# Patient Record
Sex: FEMALE | Race: OTHER | HISPANIC OR LATINO | URBAN - METROPOLITAN AREA
[De-identification: names, ages, dates, MRNs, and addresses within clinical notes are randomized per-mention and may not be internally consistent; named-entity substitution may affect disease eponyms.]

---

## 2017-09-10 ENCOUNTER — EMERGENCY (EMERGENCY)
Facility: HOSPITAL | Age: 47
LOS: 1 days | Discharge: HOME | End: 2017-09-10
Attending: EMERGENCY MEDICINE
Payer: MEDICAID

## 2017-09-10 VITALS
SYSTOLIC BLOOD PRESSURE: 110 MMHG | OXYGEN SATURATION: 98 % | RESPIRATION RATE: 16 BRPM | HEART RATE: 84 BPM | TEMPERATURE: 99 F | DIASTOLIC BLOOD PRESSURE: 64 MMHG

## 2017-09-10 VITALS
TEMPERATURE: 99 F | OXYGEN SATURATION: 100 % | HEART RATE: 89 BPM | HEIGHT: 62 IN | SYSTOLIC BLOOD PRESSURE: 93 MMHG | WEIGHT: 115.08 LBS | RESPIRATION RATE: 18 BRPM | DIASTOLIC BLOOD PRESSURE: 61 MMHG

## 2017-09-10 PROCEDURE — 99284 EMERGENCY DEPT VISIT MOD MDM: CPT

## 2017-09-10 PROCEDURE — 99282 EMERGENCY DEPT VISIT SF MDM: CPT

## 2017-09-10 RX ORDER — PENICILLIN V POTASSIUM 250 MG
1 TABLET ORAL
Qty: 30 | Refills: 0 | OUTPATIENT
Start: 2017-09-10 | End: 2017-09-20

## 2017-09-10 RX ORDER — IBUPROFEN 200 MG
1 TABLET ORAL
Qty: 20 | Refills: 0 | OUTPATIENT
Start: 2017-09-10 | End: 2017-09-15

## 2017-09-12 ENCOUNTER — EMERGENCY (EMERGENCY)
Facility: HOSPITAL | Age: 47
LOS: 1 days | Discharge: ANOTHER HOSPITAL | End: 2017-09-12
Attending: EMERGENCY MEDICINE
Payer: MEDICAID

## 2017-09-12 VITALS
DIASTOLIC BLOOD PRESSURE: 66 MMHG | WEIGHT: 115.08 LBS | RESPIRATION RATE: 20 BRPM | HEIGHT: 61 IN | OXYGEN SATURATION: 100 % | TEMPERATURE: 98 F | SYSTOLIC BLOOD PRESSURE: 101 MMHG | HEART RATE: 90 BPM

## 2017-09-12 LAB
ALBUMIN SERPL ELPH-MCNC: 3.1 G/DL — LOW (ref 3.5–5)
ALP SERPL-CCNC: 81 U/L — SIGNIFICANT CHANGE UP (ref 40–120)
ALT FLD-CCNC: 19 U/L DA — SIGNIFICANT CHANGE UP (ref 10–60)
ANION GAP SERPL CALC-SCNC: 9 MMOL/L — SIGNIFICANT CHANGE UP (ref 5–17)
APTT BLD: 28.5 SEC — SIGNIFICANT CHANGE UP (ref 27.5–37.4)
AST SERPL-CCNC: 17 U/L — SIGNIFICANT CHANGE UP (ref 10–40)
BILIRUB SERPL-MCNC: 0.3 MG/DL — SIGNIFICANT CHANGE UP (ref 0.2–1.2)
BUN SERPL-MCNC: 7 MG/DL — SIGNIFICANT CHANGE UP (ref 7–18)
CALCIUM SERPL-MCNC: 8.5 MG/DL — SIGNIFICANT CHANGE UP (ref 8.4–10.5)
CHLORIDE SERPL-SCNC: 106 MMOL/L — SIGNIFICANT CHANGE UP (ref 96–108)
CO2 SERPL-SCNC: 24 MMOL/L — SIGNIFICANT CHANGE UP (ref 22–31)
CREAT SERPL-MCNC: 0.82 MG/DL — SIGNIFICANT CHANGE UP (ref 0.5–1.3)
GLUCOSE SERPL-MCNC: 80 MG/DL — SIGNIFICANT CHANGE UP (ref 70–99)
HCG SERPL-ACNC: <1 MIU/ML — SIGNIFICANT CHANGE UP
HCT VFR BLD CALC: 33.6 % — LOW (ref 34.5–45)
HGB BLD-MCNC: 11.1 G/DL — LOW (ref 11.5–15.5)
INR BLD: 1.21 RATIO — HIGH (ref 0.88–1.16)
MCHC RBC-ENTMCNC: 26.6 PG — LOW (ref 27–34)
MCHC RBC-ENTMCNC: 32.9 GM/DL — SIGNIFICANT CHANGE UP (ref 32–36)
MCV RBC AUTO: 80.8 FL — SIGNIFICANT CHANGE UP (ref 80–100)
PLATELET # BLD AUTO: 289 K/UL — SIGNIFICANT CHANGE UP (ref 150–400)
POTASSIUM SERPL-MCNC: 4.1 MMOL/L — SIGNIFICANT CHANGE UP (ref 3.5–5.3)
POTASSIUM SERPL-SCNC: 4.1 MMOL/L — SIGNIFICANT CHANGE UP (ref 3.5–5.3)
PROT SERPL-MCNC: 7 G/DL — SIGNIFICANT CHANGE UP (ref 6–8.3)
PROTHROM AB SERPL-ACNC: 13.2 SEC — HIGH (ref 9.8–12.7)
RBC # BLD: 4.16 M/UL — SIGNIFICANT CHANGE UP (ref 3.8–5.2)
RBC # FLD: 16.5 % — HIGH (ref 10.3–14.5)
SODIUM SERPL-SCNC: 139 MMOL/L — SIGNIFICANT CHANGE UP (ref 135–145)
WBC # BLD: 15.1 K/UL — HIGH (ref 3.8–10.5)
WBC # FLD AUTO: 15.1 K/UL — HIGH (ref 3.8–10.5)

## 2017-09-12 PROCEDURE — 99285 EMERGENCY DEPT VISIT HI MDM: CPT

## 2017-09-12 PROCEDURE — 70470 CT HEAD/BRAIN W/O & W/DYE: CPT | Mod: 26

## 2017-09-12 PROCEDURE — 70491 CT SOFT TISSUE NECK W/DYE: CPT | Mod: 26

## 2017-09-12 RX ORDER — MECLIZINE HCL 12.5 MG
25 TABLET ORAL ONCE
Qty: 0 | Refills: 0 | Status: COMPLETED | OUTPATIENT
Start: 2017-09-12 | End: 2017-09-12

## 2017-09-12 RX ORDER — DEXAMETHASONE 0.5 MG/5ML
10 ELIXIR ORAL ONCE
Qty: 0 | Refills: 0 | Status: COMPLETED | OUTPATIENT
Start: 2017-09-12 | End: 2017-09-12

## 2017-09-12 RX ORDER — KETOROLAC TROMETHAMINE 30 MG/ML
15 SYRINGE (ML) INJECTION ONCE
Qty: 0 | Refills: 0 | Status: DISCONTINUED | OUTPATIENT
Start: 2017-09-12 | End: 2017-09-12

## 2017-09-12 RX ORDER — AMPICILLIN SODIUM AND SULBACTAM SODIUM 250; 125 MG/ML; MG/ML
3 INJECTION, POWDER, FOR SUSPENSION INTRAMUSCULAR; INTRAVENOUS ONCE
Qty: 0 | Refills: 0 | Status: COMPLETED | OUTPATIENT
Start: 2017-09-12 | End: 2017-09-12

## 2017-09-12 RX ORDER — AMPICILLIN SODIUM AND SULBACTAM SODIUM 250; 125 MG/ML; MG/ML
3 INJECTION, POWDER, FOR SUSPENSION INTRAMUSCULAR; INTRAVENOUS EVERY 6 HOURS
Qty: 0 | Refills: 0 | Status: DISCONTINUED | OUTPATIENT
Start: 2017-09-13 | End: 2017-09-16

## 2017-09-12 RX ORDER — KETOROLAC TROMETHAMINE 30 MG/ML
10 SYRINGE (ML) INJECTION ONCE
Qty: 0 | Refills: 0 | Status: DISCONTINUED | OUTPATIENT
Start: 2017-09-12 | End: 2017-09-12

## 2017-09-12 RX ORDER — SODIUM CHLORIDE 9 MG/ML
1000 INJECTION INTRAMUSCULAR; INTRAVENOUS; SUBCUTANEOUS
Qty: 0 | Refills: 0 | Status: DISCONTINUED | OUTPATIENT
Start: 2017-09-12 | End: 2017-09-16

## 2017-09-12 RX ORDER — DEXAMETHASONE 0.5 MG/5ML
10 ELIXIR ORAL ONCE
Qty: 0 | Refills: 0 | Status: DISCONTINUED | OUTPATIENT
Start: 2017-09-12 | End: 2017-09-12

## 2017-09-12 RX ORDER — AMPICILLIN SODIUM AND SULBACTAM SODIUM 250; 125 MG/ML; MG/ML
INJECTION, POWDER, FOR SUSPENSION INTRAMUSCULAR; INTRAVENOUS
Qty: 0 | Refills: 0 | Status: DISCONTINUED | OUTPATIENT
Start: 2017-09-12 | End: 2017-09-16

## 2017-09-12 RX ADMIN — Medication 15 MILLIGRAM(S): at 21:37

## 2017-09-12 RX ADMIN — Medication 102 MILLIGRAM(S): at 19:50

## 2017-09-12 RX ADMIN — Medication 15 MILLIGRAM(S): at 19:50

## 2017-09-12 RX ADMIN — SODIUM CHLORIDE 1000 MILLILITER(S): 9 INJECTION INTRAMUSCULAR; INTRAVENOUS; SUBCUTANEOUS at 20:03

## 2017-09-12 RX ADMIN — AMPICILLIN SODIUM AND SULBACTAM SODIUM 200 GRAM(S): 250; 125 INJECTION, POWDER, FOR SUSPENSION INTRAMUSCULAR; INTRAVENOUS at 19:45

## 2017-09-12 RX ADMIN — Medication 25 MILLIGRAM(S): at 19:45

## 2017-09-12 NOTE — ED PROVIDER NOTE - ATTENDING CONTRIBUTION TO CARE
Patient presenting with dizziness (spinning) x 1 month- worse today. It is constant and not better with stillness and eye closing. Also worsening sore throat, voice changes and fever to 103. Seen here 2 days ago for sore throat and Rx'd PCN which is not helping. No other sx. No other PMHx.  VSS. Ill appearing, whispy voice, no trismus, no drooling. + enlarge tonsil, seems to have early PTA clinically (suspect more by sx and voice). No nystagmus. Gait unsteady, can't do tandem gait, BEVERLY and FTN slow and feel 'Hard". R > L. Will check labs, give IV Unasyn, Decadron, Zofran and meclizine. Will check CT head with IVC and CT neck with same. Anticipate abscess or RP cellulitis and need for MRI.

## 2017-09-12 NOTE — ED PROVIDER NOTE - PROGRESS NOTE DETAILS
Patient feelign better but still has whispy voice. ENT hre did not call back after two pages. [facility] called and ENT and ED there have accepted patient. She has post OP collection as well as anterolateral PTA. Also still having vertigo sx (much less) which have elements concerning for central causes. Getting second dose of Unasyn Accepted to ED by Dr. Oreilly.

## 2017-09-12 NOTE — ED PROVIDER NOTE - MEDICAL DECISION MAKING DETAILS
Pt with significant dizziness and throat swelling, not improving with antibiotics.  will get soft tissue neck CT to r/o PTA, will get CT with contrast to r/o mass vs posterior circulation stroke,  IV fluids, steroids,  abx

## 2017-09-12 NOTE — ED PROVIDER NOTE - OBJECTIVE STATEMENT
45 yo F c/o weakness and dizziness for 1 month complicated by throat infection that she was given antibiotics for 2 days ago.  Pt returns to the ED because her dizziness has gotten worse and her throat infection is not getting better.  Pt unable to ambulate due to dizziness.  Fever 103 at home.  Unable to tolerate PO due to pain.  No vomiting, + nausea, no diarrhea 45 yo F c/o weakness and dizziness for 1 month complicated by throat infection that she was given antibiotics for 2 days ago.  Pt returns to the ED because her dizziness has gotten worse and her throat infection is not getting better.  Pt unable to ambulate due to dizziness.  Fever 103 at home.  Unable to tolerate PO due to pain.  No vomiting, + nausea, no diarrhea.

## 2017-09-13 ENCOUNTER — EMERGENCY (EMERGENCY)
Facility: HOSPITAL | Age: 47
LOS: 1 days | Discharge: HOME | End: 2017-09-13
Attending: EMERGENCY MEDICINE | Admitting: EMERGENCY MEDICINE
Payer: MEDICAID

## 2017-09-13 VITALS
SYSTOLIC BLOOD PRESSURE: 92 MMHG | RESPIRATION RATE: 16 BRPM | HEART RATE: 74 BPM | DIASTOLIC BLOOD PRESSURE: 55 MMHG | TEMPERATURE: 98 F | OXYGEN SATURATION: 100 %

## 2017-09-13 VITALS
TEMPERATURE: 98 F | RESPIRATION RATE: 16 BRPM | HEART RATE: 86 BPM | DIASTOLIC BLOOD PRESSURE: 51 MMHG | SYSTOLIC BLOOD PRESSURE: 92 MMHG | OXYGEN SATURATION: 98 %

## 2017-09-13 DIAGNOSIS — J02.0 STREPTOCOCCAL PHARYNGITIS: ICD-10-CM

## 2017-09-13 LAB
GRAM STN WND: SIGNIFICANT CHANGE UP
SPECIMEN SOURCE: SIGNIFICANT CHANGE UP

## 2017-09-13 PROCEDURE — 86850 RBC ANTIBODY SCREEN: CPT

## 2017-09-13 PROCEDURE — 86900 BLOOD TYPING SEROLOGIC ABO: CPT

## 2017-09-13 PROCEDURE — 80053 COMPREHEN METABOLIC PANEL: CPT

## 2017-09-13 PROCEDURE — 84702 CHORIONIC GONADOTROPIN TEST: CPT

## 2017-09-13 PROCEDURE — 85610 PROTHROMBIN TIME: CPT

## 2017-09-13 PROCEDURE — 96374 THER/PROPH/DIAG INJ IV PUSH: CPT | Mod: XU

## 2017-09-13 PROCEDURE — 85730 THROMBOPLASTIN TIME PARTIAL: CPT

## 2017-09-13 PROCEDURE — 70491 CT SOFT TISSUE NECK W/DYE: CPT

## 2017-09-13 PROCEDURE — 99218: CPT

## 2017-09-13 PROCEDURE — 70470 CT HEAD/BRAIN W/O & W/DYE: CPT

## 2017-09-13 PROCEDURE — 86901 BLOOD TYPING SEROLOGIC RH(D): CPT

## 2017-09-13 PROCEDURE — 99285 EMERGENCY DEPT VISIT HI MDM: CPT | Mod: 25

## 2017-09-13 PROCEDURE — 96375 TX/PRO/DX INJ NEW DRUG ADDON: CPT

## 2017-09-13 PROCEDURE — 85027 COMPLETE CBC AUTOMATED: CPT

## 2017-09-13 RX ORDER — MECLIZINE HCL 12.5 MG
25 TABLET ORAL ONCE
Qty: 0 | Refills: 0 | Status: DISCONTINUED | OUTPATIENT
Start: 2017-09-13 | End: 2017-09-13

## 2017-09-13 RX ORDER — AMPICILLIN SODIUM AND SULBACTAM SODIUM 250; 125 MG/ML; MG/ML
3 INJECTION, POWDER, FOR SUSPENSION INTRAMUSCULAR; INTRAVENOUS ONCE
Qty: 0 | Refills: 0 | Status: COMPLETED | OUTPATIENT
Start: 2017-09-13 | End: 2017-09-13

## 2017-09-13 RX ORDER — SODIUM CHLORIDE 9 MG/ML
1000 INJECTION INTRAMUSCULAR; INTRAVENOUS; SUBCUTANEOUS ONCE
Qty: 0 | Refills: 0 | Status: COMPLETED | OUTPATIENT
Start: 2017-09-13 | End: 2017-09-13

## 2017-09-13 RX ORDER — OXYCODONE AND ACETAMINOPHEN 5; 325 MG/1; MG/1
1 TABLET ORAL ONCE
Qty: 0 | Refills: 0 | Status: DISCONTINUED | OUTPATIENT
Start: 2017-09-13 | End: 2017-09-13

## 2017-09-13 RX ORDER — DEXAMETHASONE 0.5 MG/5ML
10 ELIXIR ORAL EVERY 8 HOURS
Qty: 0 | Refills: 0 | Status: COMPLETED | OUTPATIENT
Start: 2017-09-13 | End: 2017-09-13

## 2017-09-13 RX ORDER — MECLIZINE HCL 12.5 MG
25 TABLET ORAL THREE TIMES A DAY
Qty: 0 | Refills: 0 | Status: DISCONTINUED | OUTPATIENT
Start: 2017-09-13 | End: 2017-09-17

## 2017-09-13 RX ORDER — SODIUM CHLORIDE 9 MG/ML
1000 INJECTION INTRAMUSCULAR; INTRAVENOUS; SUBCUTANEOUS
Qty: 0 | Refills: 0 | Status: DISCONTINUED | OUTPATIENT
Start: 2017-09-13 | End: 2017-09-17

## 2017-09-13 RX ORDER — KETOROLAC TROMETHAMINE 30 MG/ML
30 SYRINGE (ML) INJECTION ONCE
Qty: 0 | Refills: 0 | Status: DISCONTINUED | OUTPATIENT
Start: 2017-09-13 | End: 2017-09-13

## 2017-09-13 RX ADMIN — Medication 30 MILLIGRAM(S): at 08:21

## 2017-09-13 RX ADMIN — Medication 100 MILLIGRAM(S): at 12:49

## 2017-09-13 RX ADMIN — AMPICILLIN SODIUM AND SULBACTAM SODIUM 200 GRAM(S): 250; 125 INJECTION, POWDER, FOR SUSPENSION INTRAMUSCULAR; INTRAVENOUS at 02:16

## 2017-09-13 RX ADMIN — Medication 100 MILLIGRAM(S): at 21:59

## 2017-09-13 RX ADMIN — SODIUM CHLORIDE 150 MILLILITER(S): 9 INJECTION INTRAMUSCULAR; INTRAVENOUS; SUBCUTANEOUS at 10:09

## 2017-09-13 RX ADMIN — Medication 30 MILLIGRAM(S): at 07:48

## 2017-09-13 RX ADMIN — Medication 102 MILLIGRAM(S): at 15:44

## 2017-09-13 RX ADMIN — Medication 102 MILLIGRAM(S): at 08:07

## 2017-09-13 RX ADMIN — Medication 100 MILLIGRAM(S): at 06:45

## 2017-09-13 RX ADMIN — OXYCODONE AND ACETAMINOPHEN 1 TABLET(S): 5; 325 TABLET ORAL at 18:29

## 2017-09-13 RX ADMIN — OXYCODONE AND ACETAMINOPHEN 1 TABLET(S): 5; 325 TABLET ORAL at 17:47

## 2017-09-13 RX ADMIN — SODIUM CHLORIDE 1000 MILLILITER(S): 9 INJECTION INTRAMUSCULAR; INTRAVENOUS; SUBCUTANEOUS at 05:20

## 2017-09-13 RX ADMIN — SODIUM CHLORIDE 1000 MILLILITER(S): 9 INJECTION INTRAMUSCULAR; INTRAVENOUS; SUBCUTANEOUS at 08:25

## 2017-09-13 NOTE — ED PROVIDER NOTE - OBJECTIVE STATEMENT
47 y/o F pt with no sig PMHx, transferred here from [facility], arrives to the ED c/o improved throat pain, vertigo, decreased eating/drinking (due to throat pain), and a subjective fever for 4 days, As per sister, pt was in the ED on Sunday and was given PCN  mg (every 8 hours for 3 days) which has some relief. Denies SOB, N/V, or any other complaints. No daily meds. NKDA.

## 2017-09-13 NOTE — ED ADULT NURSE NOTE - OBJECTIVE STATEMENT
Pt received AxOx4, was transferred here from [facility] for ENT evaluation for a peritonsil abscess. Air way is patent on exam, denies any difficulty breathing at this time. Breathing even, unlabored. VS as noted. ED providers are aware, ENT was called for consult. VS as noted. IV Fluid started as ordered. pt has 20G IV placed on L AC from [facility]. Will continue to monitor.

## 2017-09-13 NOTE — CONSULT NOTE ADULT - SUBJECTIVE AND OBJECTIVE BOX
HPI:  Pt is a 47 yo F with a PMH of Depression (diagnosed at [facility] during a visit for dizziness) who initially presented to [facility] with throat pain, decreased PO intake, fever x4 days and was transferred to [facility] for Peritonsillar Abscess (seen on CT neck) treatement. Neurology was consulted to evaluate cause of pt's persistent dizziness. Pt endorses that her dizziness started 6 months ago, but was less severe and intermittent when to compared to now.  Two weeks ago her symptoms started to gradually worsen, which is why she went to [facility], and was diagnosed with Depression and started on Sertraline. Associated with her dizziness is HA, nausea and unstable gait. Patient denies any vision changes, numbness, tingling, weakness, restricted neck movements, choking, CP, SOB, cough, vomiting, abdominal pain, changes in BMs/urination or loss of bladder/bowel control.      MEDICATIONS  (STANDING):  Sertraline  clindamycin IVPB      clindamycin IVPB 900 milliGRAM(s) IV Intermittent every 8 hours  sodium chloride 0.9%. 1000 milliLiter(s) (150 mL/Hr) IV Continuous <Continuous>    MEDICATIONS  (PRN):    PAST MEDICAL & SURGICAL HISTORY:  Depression  No significant past surgical history    FAMILY HISTORY:  No pertinent family history in first degree relatives    Allergies    No Known Allergies    Intolerances        SHx - No smoking, No ETOH, No drug abuse      Review of Systems:  See HPI.    Vital Signs Last 24 Hrs  T(C): 37.2 (13 Sep 2017 14:15), Max: 37.2 (13 Sep 2017 14:15)  T(F): 99 (13 Sep 2017 14:15), Max: 99 (13 Sep 2017 14:15)  HR: 62 (13 Sep 2017 14:15) (62 - 80)  BP: 96/59 (13 Sep 2017 14:15) (92/55 - 99/65)  BP(mean): --  RR: 16 (13 Sep 2017 14:15) (14 - 16)  SpO2: 100% (13 Sep 2017 14:15) (96% - 100%)    General Exam:   General appearance: No acute distress    Neurological Exam:  Mental Status: Orientated to self, date and place.  Attention intact.  No dysarthria. Speech fluent.  Cranial Nerves: PERRL, EOMI, VFF, no nystagmus, Ivan-Hallpike elicits mild torsional nystagmus on the right. CN V1-3 intact to light touch. No facial asymmetry. Hearing intact to finger rub bilaterally. Tongue, uvula and palate midline. Sternocleidomastoid and Trapezius intact bilaterally.    Motor:   Tone: normal, no neck stiffness or decreased ROM.               Strength:     [] Upper extremity                      Delt       Bicep    Tricep                                                  R         5/5 5/5 5/5 5/5                                               L          5/5 5/5 5/5 5/5  [] Lower extremity                       HF          KE          KF        DF         PF                                               R        5/5 5/5 5/5 5/5 5/5                                               L         5/5 5/5 5/5 5/5 5/5  Pronator drift: none                 Dysmetria: None to finger-nose-finger or heel-shin-heel b/l  No truncal ataxia.    Tremor: No resting, postural or action tremor.  No myoclonus.    Sensation: intact to light touch throughout    Deep Tendon Reflexes:              Biceps          BR        Patellar       Ankle       Babinski                                         R       2+               2+        2+              2+           downgoing                                         L        2+               2+        2+              2+           downgoing    Gait: unsteady

## 2017-09-13 NOTE — ED PROVIDER NOTE - PROGRESS NOTE DETAILS
ENT unable to drain abscess, recommend CDU for clinda/decadron and Dr sethi to come by later . Accepted by CDU

## 2017-09-13 NOTE — CONSULT NOTE ADULT - ASSESSMENT
Pt is a 45 yo F with a PMH of Depression (diagnosed at [facility] during a visit for dizziness) who initially presented to [facility] with throat pain, decreased PO intake, fever x4 days and was transferred to [facility] for Peritonsillar Abcess (seen on CT neck) treatement. Neurology was consulted to evaluate cause of pt's persistent dizziness. Based on an exam with positive Ivan-Hallpike on the right showing nystagmus and a prolonged hx of intermittent dizziness, worsened over the past 2 weeks and recent diagnosis of Peritonsillar Abscess. This is likely a peripheral vertigo that is worsened by an acute infection and decreased PO intake. However possible extension of infection into the inner ear or mastoid air cells is possibly also affecting her.    Recommendations:  - MRI Head w/ and w/o contrast with thin slices through the Ear Canal and Mastoid Air Cells  - C/W Meclizine 25mg PO Q8hr PRN Dizziness  --- Can give Valium 5mg PO Q8hr PRN persistent Dizziness  - C/W Zofran 4mg IV Q6hr PRN Nausea  - F/U w/ Neuro as outpatient, can f/u w/ own Neurologist or [address] [place] [address]

## 2017-09-13 NOTE — ED PROVIDER NOTE - QUALITY
improving throat pain, vertigo, decreased eating/drinking (due to throat pain), and a subjective fever

## 2017-09-13 NOTE — ED CDU PROVIDER NOTE - PLAN OF CARE
Rest, drink plenty of fluids.  Advance activity as tolerated.  Continue all previously prescribed medications as directed. Follow up with neuro Dr. Woodruff at [phone] and get vestibular PT done, with ENT Dr. Webster at [phone]. Follow up with your primary care physician in 48-72 hours- bring copies of your results.  Take Medrol dose pack as directed. Return to the ER for worsening or persistent symptoms, and/or ANY NEW OR CONCERNING SYMPTOMS. If you have issues obtaining follow up, please call: [phone] to obtain a doctor or specialist who takes your insurance in your area. Rest, drink plenty of fluids.  Advance activity as tolerated.  Continue all previously prescribed medications as directed. Follow up with neuro Dr. Woodruff at [phone] and get vestibular PT done, with ENT Dr. Webster at [phone]. Follow up with your primary care physician in 48-72 hours- bring copies of your results.  Take Medrol dose pack as directed. Start Clindamycin 300 mg 4 times a day for 10 days. Return to the ER for worsening or persistent symptoms, and/or ANY NEW OR CONCERNING SYMPTOMS. If you have issues obtaining follow up, please call: [phone] to obtain a doctor or specialist who takes your insurance in your area.

## 2017-09-13 NOTE — CONSULT NOTE ADULT - SUBJECTIVE AND OBJECTIVE BOX
HPI  46F no sig PMH who initially presented to [facility] with throat pain, decreased PO intake, fever x4 days and was transferred to [facility] for further eval after CT neck showed R PTA and RF fluid/edema. Patient is able to tolerate secretions, denies SOB, denies HA, is able to move neck freely.     Denies SOB, N/V, or any other complaints. No daily meds. NKDA.  Past Medical and Surgical History:  No pertinent past medical history  No significant past surgical history    Medications  MEDICATIONS  (STANDING):  clindamycin IVPB      clindamycin IVPB 900 milliGRAM(s) IV Intermittent every 8 hours  dexamethasone  IVPB 10 milliGRAM(s) IV Intermittent every 8 hours  sodium chloride 0.9%. 1000 milliLiter(s) (150 mL/Hr) IV Continuous <Continuous>    MEDICATIONS  (PRN):    Allergies  No Known Allergies    Review of Systems  General: Negative.    Neurological: Negative.  Opthalmologic: Negative.  ENT: Negative except for HPI.  Respiratory: Negative.    Cardiovascular: Negative.    Gastrointestinal: Negative.    Genitourinary: Negative.    Musculoskeletal: Negative.    Dermatologic: Negative.    Endocrinology: Negative.    Hematological: Negative.    Psychiatric: Negative.      Vital signs past 24h  Vital Signs Last 24 Hrs  T(C): 36.7 (13 Sep 2017 07:15), Max: 36.7 (13 Sep 2017 03:39)  T(F): 98.1 (13 Sep 2017 07:15), Max: 98.1 (13 Sep 2017 07:15)  HR: 70 (13 Sep 2017 07:15) (70 - 80)  BP: 98/58 (13 Sep 2017 07:15) (92/55 - 99/65)  BP(mean): --  RR: 16 (13 Sep 2017 07:15) (15 - 16)  SpO2: 98% (13 Sep 2017 07:15) (98% - 100%)    Physical Exam  Constitutional: Well developed, in no distress  HENT:         Head: Normocephalic and atraumatic       Face: Symmetric, no lesion or mass       Ears: Normal       Nose: Normal       Oral cavity and oropharynx: tongue midline, floor of mouth flat and soft, uvula slightly deviated to the left, no significant soft palate fullness, L tonsil +3 inflammed       Neck: Soft, flat, trachea midline, R sided LAD  Eyes: PERRL, EOMI  Pulmonary/Chest: Breathing comfortably on room air, no stridor or stertor  Abdominal: Non-distended  Genitourinary: Not indicated  Neurological: No focal neuro deficit, CN 2-12 grossly intact  Skin: No obvious lesion or rash  Musculoskeletal: No deformities noted, full ROM in all major joints    Psychiatric: Alert, appropriate responses and attention span     Flexible Laryngoscopy  NC: no turbinate hypertrophy, no copious secretions  OP: deviation to the left as noted on imaging however airway remains patent, tonsil inflamed and enlarged as previously noted, L vallecula effaced; BOT, posterior pharyngeal wall wnl  Supraglottis: epiglottis sharp, AE folds normal, mild arytenoid swelling c/w LPR, false vocal cords wnl  Hypopharynx: R pyriform sinuses effaced, no significant posterior pharyngeal wall edema, mild post-cricoid swelling c/w LPR  Glottis: true vocal cords with normal mobility and no lesion, edema or erythema    Procedure  Attempted but unsuccessful needle aspiration of PTA. Given no significant soft palate fullness, ? tonsillar abscess.    Imaging  Images reviewed    CT neck IMPRESSION:  Two peripherally enhancing peritonsillar collections as above, which may  have subtle communication, however not clearly identified on this  examination. Retropharyngeal edema/fluid.    Assessment and Plan  46F with PTA vs tonsillar abscess s/p attempted drainage which was unsuccessful ? tonsillar abscess instead of PTA also with RP fluid/edema. FOE shows OP deviated to the left but airway patent.  -s/p decadron x1  -continue IV clindamycin  -monitor for improvement  -if clinically worsens in the next 24h will likely admit and repeat CT neck  -call/page for worsening respiratory status, difficulty with neck motion, focal neuro signs or any other concerns  -discussed with attending Dr. Webster

## 2017-09-13 NOTE — ED CDU PROVIDER NOTE - CHPI ED SYMPTOMS POS
improving throat pain, vertigo, decreased eating/drinking (due to throat pain), and a subjective fever/DECREASED EATING/DRINKING/PAIN/FEVER

## 2017-09-13 NOTE — PROGRESS NOTE ADULT - SUBJECTIVE AND OBJECTIVE BOX
Procedure:    after verbal consent was obtained, 2cc of 1% lisocaine w/ 1:100,000 epinpnephrine was injected into the right lateral pharynx immediately posterior to the RMT. 1cc of pus was aspirated with 18G. #11 was used to make a small incision in the same region which was probed with a cotton tip applicator. pt tolerated well. sent for wound cx.     -f/u MRI for vertigo  -f/u wound cx  -finish clinda IV x 24 hours  -DC steroids

## 2017-09-13 NOTE — ED PROVIDER NOTE - CHPI ED SYMPTOMS POS
PAIN/FEVER/improving throat pain, vertigo, decreased eating/drinking (due to throat pain), and a subjective fever/DECREASED EATING/DRINKING

## 2017-09-13 NOTE — ED ADULT NURSE REASSESSMENT NOTE - NS ED NURSE REASSESS COMMENT FT1
pt is resting in bed, states that throat pain is better now. denies any trouble breathing at this time. no sign of acute distress noted at this time. VS as noted. Report was given to CDU RN. Will continue to monitor until transfer

## 2017-09-13 NOTE — ED CDU PROVIDER NOTE - PROGRESS NOTE DETAILS
I have personally seen and examined this patient. I have fully participated in the care of this patient. I have reviewed all pertinent clinical information, including history physical exam, plan and the PA's note and agree except as noted. CORINNA Jeffery MD  46F presents with pharyngitis.  She was initially seen at an OSH ED was started on antibiotics for pharyngitis. Also reports several months of intermittent vertigo and headaches, which have been worsening. + subjective fever. Symptoms continued and yesterday had CT neck showing two areas of PTA with retropharyngeal edema and fluid. CT head w contrast negative for infarct or mass. Started on decadron and augmentin, transferred for ENT.  In the ED, attempted I&D by ENT without significant drainage.   Now reports persistent HA, vertigo and throat pain, painful to swallow. Overnight afebrile, VSS. On exam well appearing, nad, tolerating secrtions, + R OP swelling w uvular deviation, lungs clear, rrr, no rash, no edema, 2+ pulses, speech clear, awake and alert.  Seen by ENT and was able to drain abscess.  Also seen by neurology given vertigo and unsteady gait, recommend MRI, but more likely peripheral vertigo.  If MRI negative, patient is medically stable for discharge home. PA Gerasimou - pt slept comfortably overnight. states pain is improving. ENT to reassess today. MRI negative, neuro to re-eval this AM. pt to be signed out to day PA. GASTON Becerril: Pt admits to feeling better, denies any complaints. Pt admits to be able to tolerate solid and liquid PO. Spoke to ENT and Neuro, both cleared the pt to dc home with out patient follow up with neuro Dr. Woodruff at [phone] and get vestibular PT done, with ENT Dr. Webster at [phone]. Dc pt home with medrol dose pack and antibiotics. DISCHARGE NOTE-AKILA WHITMAN MD   Patient is alert and oriented to person, place and date.  Patient is appropriate.  Patient denies any new or worsening physical complaints.  Patient feeling better and wants to go home.  Patient is awake and alert and in no acute distress.  Normocephalic/atraumatic.  Auricles are normal.  Neck supple.  Lungs CTAB, no wheeze, no rhonchi,  no rales.  Heart is regular rate and rhythm.  Back is nontender.  Moving all 4 extremities.   Neurologically grossly intact.  Affect is appropriate.  Right PTA s/p I+D.  Patient tolerating PO.   Understands f/u with ENT and Neuro.  Understands return instruction if undble to tolerate PO or medications.  Results of testing discussed with patient at bedside.   Copies of results provided to patient for discharge.  Patient understands that they develop new or worsening symptoms, to return to the ER immediately or call their PMD.  Patient's vital signs remain stable.  Stable for discharge.    Discharge to home.    -Akila Whitman MD

## 2017-09-13 NOTE — ED CDU PROVIDER NOTE - OBJECTIVE STATEMENT
45 y/o F pt with no sig PMHx, transferred here from [facility], arrives to the ED c/o improved throat pain, vertigo, decreased eating/drinking (due to throat pain), and a subjective fever for 4 days, As per sister, pt was in the ED on Sunday and was given PCN  mg (every 8 hours for 3 days) which has some relief. Denies SOB, N/V, or any other complaints. No daily meds. NKDA.    CDU Note: Agree with above. 45 y/o female no pmh presents to ED from [place] [facility] for PTA. Pt. went to [facility] for sore throat failing outpt axithromycin- in  ED has ct showing + PTA (multiple locations) - sent to ED for ent eval. ENT unable to drain abscess - sent to CDU for IV abx, decadron, pain control - and ENT w/ attg (Darin) will reassess possible attempt to drain in AM.

## 2017-09-13 NOTE — ED CDU PROVIDER NOTE - ATTENDING CONTRIBUTION TO CARE
Dr. Garcia:  I performed a face to face bedside interview with patient regarding history of present illness, review of symptoms and past medical history. I completed an independent physical exam.  I have discussed patient's plan of care with PA.   I agree with note as stated above, having amended the EMR as needed to reflect my findings.   This includes HISTORY OF PRESENT ILLNESS, HIV, PAST MEDICAL/SURGICAL/FAMILY/SOCIAL HISTORY, ALLERGIES AND HOME MEDICATIONS, REVIEW OF SYSTEMS, PHYSICAL EXAM, and any PROGRESS NOTES during the time I functioned as the attending physician for this patient.    46F denies pmh presents with fever and sore throat, transferred from [facility] to [facility] for ENT evaluation and management of peritonsilar abscess.    Exam:  - well appearing  - +right peritonsillar abscess and lymphadenopathy  - tolerating secretions  - rrr  - ctab    A/P  - ENT resident unable to drain PTA  - CDU for continued IV abx and ENT attending to re-evaluate

## 2017-09-13 NOTE — CONSULT NOTE ADULT - ATTENDING COMMENTS
PAtient seen and examined at bedside with neurology team and above note reviewed and I agree with assessment and plan as outlined. Patient exam consistent with peripheral vertigo and MRI brain reviewed and was normal with no intracranial pathology. She reported feeling a lot better today after the meclizine and she will follow up outpatient for vestibular therapy. She understood plan and is willing to comply.

## 2017-09-13 NOTE — ED ADULT TRIAGE NOTE - CHIEF COMPLAINT QUOTE
Pt is transfer from [place] for ENT eval for PTA. C/o right sided throat/ear pain since Friday. 3G Unasyn infusing during arrival to triage. Respirations even and unlabored in triage.

## 2017-09-13 NOTE — ED ADULT NURSE NOTE - CHIEF COMPLAINT QUOTE
Pt is transfer from [place] for ENT eval for PTA. C/o right sided throat/ear pain since Friday. 3G Unasyn infusing during arrival to triage. Respirations even and unlabored in triage.

## 2017-09-13 NOTE — ED PROVIDER NOTE - TIMING
constant/sudden onset/improving throat pain, vertigo, decreased eating/drinking (due to throat pain), and a subjective fever

## 2017-09-13 NOTE — ED PROVIDER NOTE - MEDICAL DECISION MAKING DETAILS
47 y/o F pt with PTA and vertigo secondary to infection and peripheral. Improved with medications from outside hospital. Plan to consult ENT.

## 2017-09-14 VITALS
TEMPERATURE: 98 F | SYSTOLIC BLOOD PRESSURE: 96 MMHG | OXYGEN SATURATION: 99 % | DIASTOLIC BLOOD PRESSURE: 55 MMHG | RESPIRATION RATE: 16 BRPM | HEART RATE: 65 BPM

## 2017-09-14 LAB
ALBUMIN SERPL ELPH-MCNC: 3 G/DL — LOW (ref 3.3–5)
ALP SERPL-CCNC: 61 U/L — SIGNIFICANT CHANGE UP (ref 40–120)
ALT FLD-CCNC: 9 U/L — SIGNIFICANT CHANGE UP (ref 4–33)
AST SERPL-CCNC: 10 U/L — SIGNIFICANT CHANGE UP (ref 4–32)
BASE EXCESS BLDV CALC-SCNC: -6 MMOL/L — SIGNIFICANT CHANGE UP
BASOPHILS # BLD AUTO: 0 K/UL — SIGNIFICANT CHANGE UP (ref 0–0.2)
BASOPHILS NFR BLD AUTO: 0 % — SIGNIFICANT CHANGE UP (ref 0–2)
BILIRUB SERPL-MCNC: 0.2 MG/DL — SIGNIFICANT CHANGE UP (ref 0.2–1.2)
BLOOD GAS VENOUS - CREATININE: 0.55 MG/DL — SIGNIFICANT CHANGE UP (ref 0.5–1.3)
BUN SERPL-MCNC: 10 MG/DL — SIGNIFICANT CHANGE UP (ref 7–23)
CALCIUM SERPL-MCNC: 8 MG/DL — LOW (ref 8.4–10.5)
CHLORIDE BLDV-SCNC: 107 MMOL/L — SIGNIFICANT CHANGE UP (ref 96–108)
CHLORIDE SERPL-SCNC: 107 MMOL/L — SIGNIFICANT CHANGE UP (ref 98–107)
CO2 SERPL-SCNC: 17 MMOL/L — LOW (ref 22–31)
CREAT SERPL-MCNC: 0.57 MG/DL — SIGNIFICANT CHANGE UP (ref 0.5–1.3)
EOSINOPHIL # BLD AUTO: 0 K/UL — SIGNIFICANT CHANGE UP (ref 0–0.5)
EOSINOPHIL NFR BLD AUTO: 0 % — SIGNIFICANT CHANGE UP (ref 0–6)
GAS PNL BLDV: 131 MMOL/L — LOW (ref 136–146)
GLUCOSE BLDV-MCNC: 102 — HIGH (ref 70–99)
GLUCOSE SERPL-MCNC: 103 MG/DL — HIGH (ref 70–99)
HCO3 BLDV-SCNC: 20 MMOL/L — SIGNIFICANT CHANGE UP (ref 20–27)
HCT VFR BLD CALC: 29.4 % — LOW (ref 34.5–45)
HCT VFR BLDV CALC: 30.4 % — LOW (ref 34.5–45)
HGB BLD-MCNC: 9.5 G/DL — LOW (ref 11.5–15.5)
HGB BLDV-MCNC: 9.8 G/DL — LOW (ref 11.5–15.5)
IMM GRANULOCYTES # BLD AUTO: 0.09 # — SIGNIFICANT CHANGE UP
IMM GRANULOCYTES NFR BLD AUTO: 0.6 % — SIGNIFICANT CHANGE UP (ref 0–1.5)
LACTATE BLDV-MCNC: 1.4 MMOL/L — SIGNIFICANT CHANGE UP (ref 0.5–2)
LYMPHOCYTES # BLD AUTO: 1.3 K/UL — SIGNIFICANT CHANGE UP (ref 1–3.3)
LYMPHOCYTES # BLD AUTO: 9 % — LOW (ref 13–44)
MCHC RBC-ENTMCNC: 26.2 PG — LOW (ref 27–34)
MCHC RBC-ENTMCNC: 32.3 % — SIGNIFICANT CHANGE UP (ref 32–36)
MCV RBC AUTO: 81.2 FL — SIGNIFICANT CHANGE UP (ref 80–100)
MONOCYTES # BLD AUTO: 0.95 K/UL — HIGH (ref 0–0.9)
MONOCYTES NFR BLD AUTO: 6.6 % — SIGNIFICANT CHANGE UP (ref 2–14)
NEUTROPHILS # BLD AUTO: 12.09 K/UL — HIGH (ref 1.8–7.4)
NEUTROPHILS NFR BLD AUTO: 83.8 % — HIGH (ref 43–77)
NRBC # FLD: 0 — SIGNIFICANT CHANGE UP
PCO2 BLDV: 36 MMHG — LOW (ref 41–51)
PH BLDV: 7.34 PH — SIGNIFICANT CHANGE UP (ref 7.32–7.43)
PLATELET # BLD AUTO: 264 K/UL — SIGNIFICANT CHANGE UP (ref 150–400)
PMV BLD: 10.2 FL — SIGNIFICANT CHANGE UP (ref 7–13)
PO2 BLDV: 73 MMHG — HIGH (ref 35–40)
POTASSIUM BLDV-SCNC: 3.8 MMOL/L — SIGNIFICANT CHANGE UP (ref 3.4–4.5)
POTASSIUM SERPL-MCNC: 4.1 MMOL/L — SIGNIFICANT CHANGE UP (ref 3.5–5.3)
POTASSIUM SERPL-SCNC: 4.1 MMOL/L — SIGNIFICANT CHANGE UP (ref 3.5–5.3)
PROT SERPL-MCNC: 5.6 G/DL — LOW (ref 6–8.3)
RBC # BLD: 3.62 M/UL — LOW (ref 3.8–5.2)
RBC # FLD: 18.1 % — HIGH (ref 10.3–14.5)
SAO2 % BLDV: 94.5 % — HIGH (ref 60–85)
SODIUM SERPL-SCNC: 136 MMOL/L — SIGNIFICANT CHANGE UP (ref 135–145)
SPECIMEN SOURCE: SIGNIFICANT CHANGE UP
WBC # BLD: 14.43 K/UL — HIGH (ref 3.8–10.5)
WBC # FLD AUTO: 14.43 K/UL — HIGH (ref 3.8–10.5)

## 2017-09-14 PROCEDURE — 99217: CPT

## 2017-09-14 PROCEDURE — 99284 EMERGENCY DEPT VISIT MOD MDM: CPT | Mod: GC

## 2017-09-14 PROCEDURE — 70553 MRI BRAIN STEM W/O & W/DYE: CPT | Mod: 26

## 2017-09-14 RX ORDER — ACETAMINOPHEN 500 MG
650 TABLET ORAL ONCE
Qty: 0 | Refills: 0 | Status: DISCONTINUED | OUTPATIENT
Start: 2017-09-14 | End: 2017-09-17

## 2017-09-14 RX ORDER — DEXAMETHASONE 0.5 MG/5ML
10 ELIXIR ORAL ONCE
Qty: 0 | Refills: 0 | Status: DISCONTINUED | OUTPATIENT
Start: 2017-09-14 | End: 2017-09-17

## 2017-09-14 RX ADMIN — Medication 25 MILLIGRAM(S): at 06:57

## 2017-09-14 RX ADMIN — Medication 100 MILLIGRAM(S): at 06:57

## 2017-09-14 NOTE — ED POST DISCHARGE NOTE - RESULT SUMMARY
Wound Gram Stain: Gram Positive Rods, Gram Positive Cocci in Pairs, WBCs.  WCX prelim.  Pt discharged on Cleocin 300mg QID x 10 days.  Follow results to final.

## 2017-09-15 DIAGNOSIS — K65.1 PERITONEAL ABSCESS: ICD-10-CM

## 2017-09-15 DIAGNOSIS — Z79.1 LONG TERM (CURRENT) USE OF NON-STEROIDAL ANTI-INFLAMMATORIES (NSAID): ICD-10-CM

## 2017-09-15 DIAGNOSIS — Z79.2 LONG TERM (CURRENT) USE OF ANTIBIOTICS: ICD-10-CM

## 2017-09-15 DIAGNOSIS — R42 DIZZINESS AND GIDDINESS: ICD-10-CM

## 2017-09-18 LAB — BACTERIA WND CULT: SIGNIFICANT CHANGE UP

## 2023-07-24 NOTE — ED PROVIDER NOTE - CARE PLAN
CHW - Outreach Attempt    Eliane Osullivan Community Health Worker left a voicemail message for 3rd attempt to contact patient regarding: Community Health Program     CHW - Unable to Contact    Community Health Worker to close episode at this time due to three missed attempts for patient contact.          Principal Discharge DX:	Peritoneal abscess